# Patient Record
Sex: MALE | Race: WHITE
[De-identification: names, ages, dates, MRNs, and addresses within clinical notes are randomized per-mention and may not be internally consistent; named-entity substitution may affect disease eponyms.]

---

## 2019-04-24 ENCOUNTER — HOSPITAL ENCOUNTER (OUTPATIENT)
Dept: HOSPITAL 80 - FED | Age: 69
Setting detail: OBSERVATION
LOS: 1 days | Discharge: HOME | End: 2019-04-25
Attending: INTERNAL MEDICINE | Admitting: INTERNAL MEDICINE
Payer: COMMERCIAL

## 2019-04-24 DIAGNOSIS — R53.1: ICD-10-CM

## 2019-04-24 DIAGNOSIS — R20.2: Primary | ICD-10-CM

## 2019-04-24 DIAGNOSIS — R26.0: ICD-10-CM

## 2019-04-24 LAB
INR PPP: 1.02 (ref 0.83–1.16)
PLATELET # BLD: 254 10^3/UL (ref 150–400)
PROTHROMBIN TIME: 13 SEC (ref 12–15)

## 2019-04-24 PROCEDURE — 97161 PT EVAL LOW COMPLEX 20 MIN: CPT

## 2019-04-24 PROCEDURE — 70498 CT ANGIOGRAPHY NECK: CPT

## 2019-04-24 PROCEDURE — G0378 HOSPITAL OBSERVATION PER HR: HCPCS

## 2019-04-24 PROCEDURE — 93005 ELECTROCARDIOGRAM TRACING: CPT

## 2019-04-24 PROCEDURE — 99291 CRITICAL CARE FIRST HOUR: CPT

## 2019-04-24 PROCEDURE — 70551 MRI BRAIN STEM W/O DYE: CPT

## 2019-04-24 PROCEDURE — 93306 TTE W/DOPPLER COMPLETE: CPT

## 2019-04-24 PROCEDURE — 97165 OT EVAL LOW COMPLEX 30 MIN: CPT

## 2019-04-24 PROCEDURE — 97116 GAIT TRAINING THERAPY: CPT

## 2019-04-24 PROCEDURE — 70450 CT HEAD/BRAIN W/O DYE: CPT

## 2019-04-24 PROCEDURE — 70496 CT ANGIOGRAPHY HEAD: CPT

## 2019-04-24 PROCEDURE — 92523 SPEECH SOUND LANG COMPREHEN: CPT

## 2019-04-24 NOTE — ECHO
[email or web address]





[address] [address] 

Main: [phone] 



Echocardiography Examination 

Transthoracic 



Name:            TAMIKO JOHNSON                        MR#:

[id number]

Study Date:      04/24/2019                            Study Time:

03:02 PM

YOB: 1950                            Age:

68 year(s)

Height:          167.6 cm (66 in.)                     Weight:

54.43 kg (120 lb.)

BSA:             1.61 m2                               Gender:

Male

Examination:     Echo with Agitated Saline             Contrast:

I.V. dose of agitated saline

Image Quality:   Adequate                              Rhythm:

Normal sinus rhythm

Heart Rate:      54 bpm                                BP:

/63 mmHg

Indication:      left sided weakness 



Procedure Staff 

Referring Physician: 

Echocardiographer:         Malorie Zapien [facility] 

Reading Physician:         Garima Templeton MD 

Requesting Provider: 

Ordering Physician:        Luli Pickard 

Indication:                left sided weakness 



Measurements 

Chambers                                           AV/MV 

Label                 Value       Normal Value          Label

Value       Normal Value

LVOT Vmax            0.91 m/s     (0.7m/s - 1.1m/s)     AV PGmax

6 mmHg

LVOTd                2 cm         (1.9cm - 2.1cm)       AV Vmax

1.23 m/s

LVDd, 2D             4.1 cm       (4.2cm - 5.9cm)       ELLIOT (Vmax)

2.3 cm2

LVDs, 2D             2.7 cm       (2.1cm - 4cm)         MV E Vmax

0.8 m/s

IVSd, 2D             0.8 cm       (0.6cm - 1.1cm)       MV A Vmax

0.59 m/s

LVPWd, 2D            0.7 cm       (0.6cm - 1cm)         MV E/A

1.36

LVEF, BP             72 %         (55% - 70%)           MV E/E'

lateral      5.6

LVEF, 2D             64 %         (54% - 74%)           MV E/E' septal

9.6 (0.45 - 1.25)

RVDd, 2D             3.6 cm       (1.9cm - 3.8cm)       MV DT

158 ms

TAPSE                2.7 cm                             MV E' septal

0.08 m/s

LA Volume, BP        43 ml        (18ml - 58ml)         MV E' lateral

0.14 m/s

LADs, 2D             3.2 cm       (3cm - 4cm)           MV E/E' mean

7.27

LAESV index, BP      26.7 ml/m2                         MV E' mean

0.11 m/s

RA Area              15.8 cm2                      TV/PV 

Additional Vessels                                      Label

Value       Normal Value

Label                 Value       Normal Value          PV PGmax

3 mmHg

AoAsc                3.1 cm                             PV Vmax,

Caliper     0.92 m/s     (0.6m/s - 0.9m/s)

AoRoot, 2D           2.9 cm       (1.4cm - 2.6cm) 



Patient: TAMIKO JOHNSON                      MRN: [id number]

Study Date: 04/24/2019   Page 1 of 3

03:02 PM 









Conclusions 

1.  The left ventricle is normal in size and systolic function.

Ejection fraction is 72%.  Normal wall motion and normal

diastolic function. 

2. there is mildly increased right ventricular wall thickness.  The RV

cavity size is normal with normal RV systolic

function.   

3. Negative bubble study 

4. mild mitral regurgitation. 

5.  Trivial tricuspid regurgitation.  Unable to assess PA systolic

pressure.

6. no previous echo 



Findings 



Left Ventricle: 

Left ventricle is normal in size. Normal global systolic left

ventricular function. EF evaluated by EF (biplane Stein's).

The ejection fraction, measured by Simpsons method, is 72 %. EF range

is estimated at 70 % - 75 %. Left ventricle

wall thickness is normal. There are no regional wall motion

abnormalities. Left ventricular diastolic function parameters

are normal.  

Right Ventricle: 

Normal size right ventricle. Right ventricular wall thickness is

mildly increased. Right ventricular systolic function is

normal.  

Left Atrium: 

The left atrium is normal in size.  

IAS: 

An agitated saline study was performed and was negative for

intracardiac shunting.

Right Atrium: 

The right atrium is normal in size.  

Mitral Valve: 

Mitral valve appears structurally normal. Mild mitral regurgitation.

No mitral valve stenosis.

Aortic Valve: 

The aortic valve is structurally normal and trileaflet. No aortic

valve regurgitation. There is no aortic stenosis.

Tricuspid Valve: 

Tricuspid valve leaflets are structurally normal. Trivial tricuspid

regurgitation. Pulmonary artery pressure cannot be

assessed due to inadequate TR signal.  

Pulmonic Valve: 

Pulmonic leaflets are structurally normal. No pulmonic valve

regurgitation is evident.

Aorta: 

The aortic root size in 2D measures 2.9 cm. The aortic root exhibits

normal size. The ascending aorta measures 3.1

cm. Ascending aorta is normal in size.  

Aorta Measurements 

AoRoot, 2D is 2.9 cm.  

IVC: 

The inferior vena cava is dilated. The respirophasic change in

diameter is more than 50%.

Pericardium: 

No pericardial effusion.  



Exam Details 

Procedure Ordered:         Echo with Agitated Saline 

Procedure Status:          Routine study 

Image Quality:             Adequate 

Contrast:                       I.V. dose of agitated

salineIntravenous contrast was administered to evaluate



intracardiac shunting 

Facility Location:         Cardiac Echo 1 



Patient: TAMIKO JOHNSON                      MRN: [id number]

Study Date: 04/24/2019   Page 2 of 3

03:02 PM 









Electronically signed by Garima Templeton MD on 04/24/2019 at 07:35 PM 

(No Signature Object) 



Patient: TAMIKO JOHSNON                      MRN: [id number]

Study Date: 04/24/2019   Page 3 of 3

03:02 PM 







[id number]

## 2019-04-24 NOTE — GHP
[f rep st]



                                                            HISTORY AND PHYSICAL





DATE OF ADMISSION:  04/24/2019



CHIEF COMPLAINT:  Falling to the left side, gait instability.



HISTORY OF PRESENT ILLNESS:  A pleasant 68-year-old male with no significant past medical history, pr
esenting with imbalance and left leg weakness.  He awoke this morning at 5:30, had coffee and cereal 
without issue.  He was in his workshop working on a project and noted that he was leaning to the left
 and would have to correct his balance.  Denied any overt weakness or dysarthria.  He has noticed hav
ing a loss of balance over the last few weeks, but has not fallen.  This morning his left leg was epi
gging.  He has noted some trouble expressing himself over the last couple of weeks.  For instance, la
st night he had a friend over and was trying to describe something but it took quite some time to get
 the words out.  He runs a 10 K 2-3 times a week without chest pain or shortness of breath.  Yesterda
y, he noticed an episode of palpitations in which his heart rate did not return to normal and this is
 uncommon for him.  No dizziness or lightheadedness.  Denies fevers, chills, or sweats.  No nausea, v
omiting, or diarrhea.



REVIEW OF SYSTEMS:  I completed a 10-point review of systems, negative except as noted in HPI.



PAST MEDICAL HISTORY:  None.



PAST SURGICAL HISTORY:  Appendectomy.



SOCIAL HISTORY:  Lives in [facility].  He was an .  Drinks a beer daily.  No tobacco or i
llicits.



FAMILY HISTORY:  No strokes.  Father with heart failure.  Brother with prostate cancer.



PHYSICAL EXAMINATION:  VITAL SIGNS:  Temperature 37.2, blood pressure 169/89, repeat 137/80, heart ra
te in the 50s, respirations 16, 96% on room air.  GENERAL:  He is well appearing, no acute distress. 
 HEENT:  PERRLA.  Moist mucous membranes.  CARDIOVASCULAR:  Zeb, but regular.  No murmurs, gallops,
 or rubs.  LUNGS:  Clear.  ABDOMEN:  Soft, nontender.  :  No Bates.  MUSCULOSKELETAL:  5/5 upper an
d lower extremity strength.  NEUROLOGIC:  2 through 12 intact.  No dysarthria.  PSYCH:  Alert and justien
ented x3.



LABS:  WBC 5, hemoglobin 15, hematocrit 47, platelets 254.  Coags within normal.  Sodium 142, potassi
um 4.1, chloride 105, carbon dioxide 26, creatinine 1.0, glucose 115.  Troponin 0.00. 



Head CT negative.  Head and neck CTA negative. 



EKG personally reviewed.  Bradycardia, ST-elevation 3-4, LVH.



ASSESSMENT/PLAN:  

1.  Left-sided weakness/gait instability:  Concern for transient ischemic attack versus stroke.  Init
ial head/neck CTA unremarkable.  Check MRI echocardiogram.  Monitor on telemetry.  PT, OT, and Speech
 to evaluate.  Start a baby aspirin.  Check lipids.  May benefit from a Holter monitor. 

2.  Hypertension:  Has normalized.  Will monitor closely.

3.  Diet regular __________.  

4.  Deep venous thrombosis, SCDs.



DISPOSITION:  Observation admission given concern for TIA or stroke, warranting further imaging and N
eurology evaluation.





Job #:  [id number]

## 2019-04-24 NOTE — EDPHY
H & P


Time Seen by Provider: 04/24/19 10:58


HPI/ROS: 





CHIEF COMPLAINT:  Left arm and leg numbness, left leg weakness





HISTORY OF PRESENT ILLNESS:  Last felt normal at 7:00 a.m..  Felt like he was 

leaning a bit to the left walking to his shop.  Little bit of numbness and 

tingling on the left side both arm and leg.  Not associated with speech 

difficulty or headache or difficulty with balance.  He says he has a little bit 

of dizziness.


No neck pain.  Symptoms mild, not better or worse with anything.





REVIEW OF SYSTEMS:


Eye: no change in vision


ENT: no sore throat


Cardiac: no chest pain or syncope


Pulmonary: no cough or SOB


Abdomen: no vomiting, diarrhea, abdominal pain


Musculoskeletal: no back pain


Skin: no rash


Neuro: no headache


Constitutional: no fever


: no urinary symptoms





A comprehensive 10 point review of systems is otherwise negative aside from 

elements mentioned in the history of present illness.





PAST MEDICAL HISTORY: negative





Social history: negative





General Appearance: Alert and conversant, cooperative.


Eyes: No scleral  icterus. 


ENT, Mouth: Normal mucous membranes.


Respiratory: Normal respiratory effort, breath sounds equal, lungs are clear to 

auscultation.


Cardiovascular:  Regular rate and rhythm.


Gastrointestinal:  Abdomen soft nontender


Neurological:  Alert, face symmetric, speech fluent.  He is ambulatory without 

ataxia back to the room.  He has some decreased sensation to light touch in his 

left forearm and left leg but 5/5 strength in deltoids triceps bicep wrist 

extensors and intrinsic of both hands and 5/5 strength in dorsiflexion and 

plantar flexion of both feet and extension and flexion of both knees.


Skin: Warm and dry, no rashes.


Musculoskeletal: No peripheral edema.


Psychiatric: Not agitated.





Emergency Department course/MDM:





Patient was made a stroke alert from triage.  I discussed the patient that I 

would not recommend giving him IV Activase given that on initial physical 

examination I cannot detect objective deficit.  I feel that potential risk from 

that medication would exceed benefit in this case and the patient is in 

agreement.  Therefore CT angiography is performed with the initial imaging 

studies.Discussed with Dr. Briggs from [place] 1113am agrees with no IV tPA given 

neurologic exam, CT/CTA.  





1123:  Normal CT CT per Dr. Luda Dawkins.  Admission for further stroke workup 

recommended.


Results discussed with the patient and with Dr. Castellon at this time.  He 

recommends oral aspirin if passes swallow study.





Of note the patient says he did have an episode of rapid heart rate 

palpitations just a couple of days ago.


Constitutional: 


 Initial Vital Signs











Temperature (C)  36.9 C   04/24/19 10:54


 


Heart Rate  56 L  04/24/19 10:54


 


Respiratory Rate  16   04/24/19 10:54


 


Blood Pressure  162/104 H  04/24/19 10:54


 


O2 Sat (%)  97   04/24/19 10:54








 











O2 Delivery Mode               Room Air














Allergies/Adverse Reactions: 


 





No Known Allergies Allergy (Verified 04/24/19 12:05)


 








Home Medications: 














 Medication  Instructions  Recorded


 


Acetaminophen [Tylenol 325mg (*)] 325 mg PO Q6 PRN 04/24/19














Medical Decision Making





- Diagnostics


EKG Interpretation: 





12-lead EKG interpreted by me; official reading is in computer system.  My 

interpretation is sinus rhythm with LVH rate 52


Imaging Results: 


 Imaging Impressions





Head CT  04/24/19 11:08


Impression:    Normal.  No evidence for acute or subacute stroke.


 


Findings and recommendations discussed with LASHAWN MULLER  at 11:22 AM hour, 4/24/ 2019.


 


Final report concurs with initial preliminary interpretation.


 


 








Head CTA  04/24/19 11:08


Impression: Nothing acute.


 


Findings and recommendations discussed with Dr. Lashawn Muller at 1122 hours on 

April 24, 2019.


 


Final report concurs with initial preliminary interpretation.


 


 


 


 








Neck CTA  04/24/19 11:08


Impression:  Normal.


 


Findings and recommendations discussed with LASHAWN MULLER  at 11:22 AM hour, 4/24/ 2019.


 


Final report concurs with initial preliminary interpretation.


 


Note: All stenoses are calculated using NASCET Criteria.


 


 


 











Imaging: Discussed imaging studies w/ On call Radiologist


Differential Diagnosis: 





Differential considered including but not limited to spinal cord problem or 

transverse myelitis, TIA, stroke, peripheral nerve issue


Consult/Admit Bed Type: [facility] for Dr. Pickard 8556


Critical Care Time: 





Critical care time spent by me, Dr. Muller, exclusively with the care of this 

patient was 30 minutes, exclusive of PA or NP time and exclusive of separate 

procedures.  The organ system at risk was neurologic and I ordered multiple 

diagnostics and specialist consultation.





- Data Points


Laboratory Results: 


 Laboratory Results





 04/24/19 11:04 





 04/24/19 11:04 





 











  04/24/19 04/24/19 04/24/19





  11:09 11:06 11:04


 


WBC      





    


 


RBC      





    


 


Hgb      





    


 


POC Hgb  16.7 gm/dL gm/dL    





   (13.7-17.5)   


 


Hct      





    


 


POC Hct  49 % %    





   (40-51)   


 


MCV      





    


 


MCH      





    


 


MCHC      





    


 


RDW      





    


 


Plt Count      





    


 


MPV      





    


 


Neut % (Auto)      





    


 


Lymph % (Auto)      





    


 


Mono % (Auto)      





    


 


Eos % (Auto)      





    


 


Baso % (Auto)      





    


 


Nucleat RBC Rel Count      





    


 


Absolute Neuts (auto)      





    


 


Absolute Lymphs (auto)      





    


 


Absolute Monos (auto)      





    


 


Absolute Eos (auto)      





    


 


Absolute Basos (auto)      





    


 


Absolute Nucleated RBC      





    


 


Immature Gran %      





    


 


Immature Gran #      





    


 


PT      





    


 


INR      





    


 


POC Sodium  142 mEq/L mEq/L    





   (135-145)   


 


Sodium      140 mEq/L mEq/L





     (135-145) 


 


POC Potassium  4.1 mEq/L mEq/L    





   (3.3-5.0)   


 


Potassium      4.3 mEq/L mEq/L





     (3.5-5.2) 


 


POC Chloride  104 mEq/L mEq/L    





   ()   


 


Chloride      104 mEq/L mEq/L





     () 


 


Carbon Dioxide      26 mEq/l mEq/l





     (22-31) 


 


POC Total CO2  26 mEq/L mEq/L    





   (22-31)   


 


Anion Gap      10 mEq/L mEq/L





     (6-14) 


 


POC BUN  21 mg/dL mg/dL    





   (7-23)   


 


BUN      21 mg/dL mg/dL





     (7-23) 


 


Creatinine      1.0 mg/dL mg/dL





     (0.7-1.3) 


 


POC Creatinine  1.0 mg/dL mg/dL    





   (0.7-1.3)   


 


Estimated GFR      > 60 





    


 


Glucose      110 mg/dL H mg/dL





     () 


 


POC Glucose  115 mg/dL H mg/dL    





   ()   


 


Calcium      9.9 mg/dL mg/dL





     (8.5-10.4) 


 


POC Troponin I    0.01 ng/mL ng/mL  





    (0.00-0.08)  














  04/24/19 04/24/19





  11:04 11:04


 


WBC    5.18 10^3/uL 10^3/uL





    (3.80-9.50) 


 


RBC    5.28 10^6/uL 10^6/uL





    (4.40-6.38) 


 


Hgb    15.3 g/dL g/dL





    (13.7-17.5) 


 


POC Hgb    





   


 


Hct    47.5 % %





    (40.0-51.0) 


 


POC Hct    





   


 


MCV    90.0 fL fL





    (81.5-99.8) 


 


MCH    29.0 pg pg





    (27.9-34.1) 


 


MCHC    32.2 g/dL L g/dL





    (32.4-36.7) 


 


RDW    13.9 % %





    (11.5-15.2) 


 


Plt Count    254 10^3/uL 10^3/uL





    (150-400) 


 


MPV    10.8 fL fL





    (8.7-11.7) 


 


Neut % (Auto)    64.4 % %





    (39.3-74.2) 


 


Lymph % (Auto)    23.2 % %





    (15.0-45.0) 


 


Mono % (Auto)    10.6 % %





    (4.5-13.0) 


 


Eos % (Auto)    1.2 % %





    (0.6-7.6) 


 


Baso % (Auto)    0.4 % %





    (0.3-1.7) 


 


Nucleat RBC Rel Count    0.0 % %





    (0.0-0.2) 


 


Absolute Neuts (auto)    3.34 10^3/uL 10^3/uL





    (1.70-6.50) 


 


Absolute Lymphs (auto)    1.20 10^3/uL 10^3/uL





    (1.00-3.00) 


 


Absolute Monos (auto)    0.55 10^3/uL 10^3/uL





    (0.30-0.80) 


 


Absolute Eos (auto)    0.06 10^3/uL 10^3/uL





    (0.03-0.40) 


 


Absolute Basos (auto)    0.02 10^3/uL 10^3/uL





    (0.02-0.10) 


 


Absolute Nucleated RBC    0.00 10^3/uL 10^3/uL





    (0-0.01) 


 


Immature Gran %    0.2 % %





    (0.0-1.1) 


 


Immature Gran #    0.01 10^3/uL 10^3/uL





    (0.00-0.10) 


 


PT  13.0 SEC SEC  





   (12.0-15.0)  


 


INR  1.02   





   (0.83-1.16)  


 


POC Sodium    





   


 


Sodium    





   


 


POC Potassium    





   


 


Potassium    





   


 


POC Chloride    





   


 


Chloride    





   


 


Carbon Dioxide    





   


 


POC Total CO2    





   


 


Anion Gap    





   


 


POC BUN    





   


 


BUN    





   


 


Creatinine    





   


 


POC Creatinine    





   


 


Estimated GFR    





   


 


Glucose    





   


 


POC Glucose    





   


 


Calcium    





   


 


POC Troponin I    





   











Medications Given: 


 








Discontinued Medications





Aspirin (Aspirin)  324 mg PO EDNOW ONE


   Stop: 04/24/19 11:58


   Last Admin: 04/24/19 12:00 Dose:  324 mg





Point of Care Test Results: 


 Chemistry











  04/24/19 04/24/19





  11:09 11:06


 


POC Sodium  142 mEq/L mEq/L  





   (135-145)  


 


POC Potassium  4.1 mEq/L mEq/L  





   (3.3-5.0)  


 


POC Chloride  104 mEq/L mEq/L  





   ()  


 


POC Total CO2  26 mEq/L mEq/L  





   (22-31)  


 


POC BUN  21 mg/dL mg/dL  





   (7-23)  


 


POC Creatinine  1.0 mg/dL mg/dL  





   (0.7-1.3)  


 


POC Glucose  115 mg/dL H mg/dL  





   ()  


 


POC Troponin I    0.01 ng/mL ng/mL





    (0.00-0.08) 








 ISTAT H&H











  04/24/19





  11:09


 


POC Hgb  16.7 gm/dL gm/dL





   (13.7-17.5) 


 


POC Hct  49 % %





   (40-51) 














Departure





- Departure


Disposition: [facility] Inpatient Acute


Clinical Impression: 


 Left-sided weakness





Condition: Good

## 2019-04-24 NOTE — CPEKG
Test Reason : OPEN

Blood Pressure : ***/*** mmHG

Vent. Rate : 052 BPM     Atrial Rate : 050 BPM

   P-R Int : 173 ms          QRS Dur : 092 ms

    QT Int : 438 ms       P-R-T Axes : 059 055 052 degrees

   QTc Int : 408 ms

 

Sinus rhythm

Probable left ventricular hypertrophy

ST elevation, consider anterior injury

 

Confirmed by Lashawn Muller ([id number]) on 4/24/2019 11:42:03 AM

 

Referred By: LASHAWN MULLER           Confirmed By:Lashawn Muller

## 2019-04-24 NOTE — GCON
[f 
rep st]



                                                                    CONSULTATION





NEUROLOGY CONSULTATION



REFERRING PHYSICIAN:  Luli Pickard MD



CHIEF COMPLAINT:  Numbness.



HISTORY OF PRESENT ILLNESS:  The patient is a very pleasant 68-year-old 
gentleman who is a lifelong runner and a very fit and active gentleman.  He has 
been retired from engineering for 2 years.  He also has been remodeling his 
home himself for the last years with quite a bit of physical labor.  Yesterday, 
he went on a 6-mile run and had a nice evening with friends.  This morning, he 
woke up feeling a little lightheaded or had been feeling a little lightheaded 
for a couple days prior.  He then noticed while working in his workshop that he 
felt suddenly numb in the left arm and left leg and felt like he was listing to 
the left side.  He denies any weakness, mainly a purely sensory syndrome in the 
left arm, left leg, but not in the left face.  No language symptoms.  No visual 
loss.  No neglect.  He came to the emergency department, had a full stroke 
evaluation with our ED and telemedicine.  Based on the history and his exam 
findings, he was not considered to be a tPA candidate.  CT of the head and neck 
were essentially normal.  There was no large vessel occlusion.  He was admitted 
for further evaluation.  The patient does not take any medications at baseline.
  He does not take an anti-platelet agent.  He has had some palpitations, he 
thinks in the last weeks.



REVIEW OF SYSTEMS:  A 10-point review of systems was done and only pertinent to 
the HPI. 



For past medical history, social history, family history, home medications, and 
allergies, see Dr. Pickard's H and P.



PHYSICAL EXAM:  VITAL SIGNS:  Blood pressure is 126/60, temperature is 36.6, 
heart rate 50s to 60s.  GENERAL:  In no acute distress.  Very pleasant.  
NEUROLOGIC:  On higher mental function, normal.  No aphasia.  Cranial nerve 
exam normal 2 through 7, 11 and 12.  Motor exam:  Normal strength, tone, 
reflexes throughout.  Sensory exam:  Normal to light touch in all 4 
extremities.  Coordination is normal in upper and lower extremities.



IMPRESSION/PLAN:  



1. Numbness.  



The patient's sudden onset left arm and left leg numbness may represent a small 
vessel lacunar-type infarct.  Other possibilities include cervical degenerative 
disk disease.  We discussed at length.  Going forward, I recommend and agree 
with a full stroke evaluation including MRI brain without contrast, 
echocardiogram with bubble study and ECG monitoring.  I also recommend anti-
platelet therapy and statin therapy at this point.  



Going forward, if the transient ischemic attack is cryptogenic, then we would 
recommend Plavix 75 mg daily plus aspirin 81 mg for 21 days, then 81 mg of 
aspirin indefinitely.  We discussed potential risks, benefits and alternatives 
of dual and single antiplatelet therapy including the risk of bleeding and 
bruising.  



He will need outpatient ECG monitoring to screen for PAF.  We will follow up on 
the above.  No further recommendations now.  Thank you for the consultation.  
Please do not hesitate to call if there are any questions or changes in this 
patient's neurologic status.  Seventy total minutes floor time; over 50% in 
direct counseling and coordination of care.





Job #:  [id number]

MTDD

## 2019-04-25 VITALS — SYSTOLIC BLOOD PRESSURE: 133 MMHG | DIASTOLIC BLOOD PRESSURE: 78 MMHG

## 2019-04-25 NOTE — ASMTCMCOM
CM Note

 

CM Note                       

Notes:

Pt admitted to L sided weakness. Symptoms have resolved, may have had a TIA. Pt is normally very 

active and lives at home with his wife. 



Pt will dc today with support of wife and f/u outpt with cardiology. No other needs identified, CM 

available should his needs change.



DC Plan: Independent

 

Date Signed:  04/25/2019 11:07 AM

Electronically Signed By:Jordyn Watts RN

## 2019-04-25 NOTE — ASMTLACE
LACE

 

Length of stay for            Answers:  1 day                                 

current admission                                                             

Acuity / Level of             Answers:  No                                    

Care: Did the patient                                                         

have an inpatient                                                             

admission?                                                                    

Comorbidities - select        Answers:  Other                         Notes:  TIA

all that apply                                                                

# of Emergency department     Answers:  1-2                                   

visits in the last 6                                                          

months                                                                        

Score: 3

 

Date Signed:  04/25/2019 11:03 AM

Electronically Signed By:Jordyn Watts RN

## 2019-04-25 NOTE — PDDCSUM
Discharge Summary


Discharge Summary: 





Discharge diagnosis


Left-sided weakness


Gait instability/ataxia


Hypertension


Possible TIA








The patient is a healthy 60-year-old male who presented with acute onset of left

-sided weakness with gait instability.  CT head was obtained which showed no 

acute bleed.  CTA head and neck, MRI brain, echocardiogram were all obtained 

with no abnormality.  He was monitored on telemetry which showed no arrhythmias 

and no paroxysmal atrial fibrillation.  Neurology was consulted who recommended 

starting aspirin 81 mg with Plavix 75 mg for 21 days, followed by aspirin 81 mg 

indefinitely.  It was recommended that a event monitor be placed to determine 

if the patient was having paroxysmal AFib.  Cardiology was contacted and the 

patient was given an event monitor prior to discharge and was set up for follow-

up with them.  He was also instructed to follow up with Neurology in 

approximately 8 weeks.  His symptoms had essentially resolved and he was 

feeling better on the day of discharge.





Disposition


Home independent





New medications


Aspirin 81 mg indefinitely


Plavix 75 mg for 21 days





Follow-up


Cardiology for evaluation of his event monitor


Neurology for follow-up of his neurologic deficits.

## 2019-04-25 NOTE — CPEKG
Test Reason : OPEN

Blood Pressure : ***/*** mmHG

Vent. Rate : 047 BPM     Atrial Rate : 047 BPM

   P-R Int : 173 ms          QRS Dur : 081 ms

    QT Int : 462 ms       P-R-T Axes : 070 067 055 degrees

   QTc Int : 409 ms

 

Sinus bradycardia

Probable left ventricular hypertrophy

ST elevation, suggests early repolarization

 

Confirmed by Giovanni Dumont ([id number]) on 4/25/2019 10:28:26 AM

 

Referred By: Luli Pickard           Confirmed By:Giovanni Dumont

## 2019-04-25 NOTE — NEUROPROG
Assessment: 


1. Numbness, resolved





Patient's MRI brain showed no acute infarct.  Echocardiogram did not show any 

intracardiac thrombus.  No right-to-left shunt





The numbness is completely resolved.  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 

his cervical spine.





We discussed the differential diagnosis may be TIA verses cervicogenic 

symptomatology





Going forward, I recommend we complete the TIA treatment and workup as following

:





1. Dual anti-platelet therapy for 21 days.  Plavix 75 mg plus aspirin 81 mg 

daily for 21 days; then discontinue Plavix continue aspirin indefinitely.


2. Outpatient cardiology consult and 30 day event monitor screen for paroxysmal 

atrial fibrillation.  Implantable loop monitor could be considered after that 

if negative.


3. Follow-up with Neurology in 8-12 weeks to review the above outpatient workup

; and at that point we may evaluate further with MRI cervical spine.





Patient will likely discharge home later today.  We will sign off and follow up 

as needed.  Please do not hesitate to call for any questions or changes in 

neurologic symptoms.  We appreciate the consultation.  35 total minutes floor 

time; over 50% counseling and coordination of care.


Subjective: 


Symptoms have resolved


Objective: 





 Vital Signs











Temp Pulse Resp BP Pulse Ox


 


 36.5 C   48 L  17   133/66 H  95 


 


 04/25/19 07:44  04/25/19 07:44  04/25/19 07:44  04/25/19 07:44  04/25/19 07:44








 











 04/24/19 04/25/19 04/26/19





 05:59 05:59 05:59


 


Intake Total  900 500


 


Output Total  200 


 


Balance  700 500








 











PT  13.0 SEC (12.0-15.0)   04/24/19  11:04    


 


INR  1.02  (0.83-1.16)   04/24/19  11:04    








Awake and alert


No motor or sensory deficits


No aphasia


Allergies/Adverse Reactions: 


 





No Known Allergies Allergy (Verified 04/24/19 12:05)